# Patient Record
Sex: FEMALE | Race: WHITE | Employment: FULL TIME | URBAN - METROPOLITAN AREA
[De-identification: names, ages, dates, MRNs, and addresses within clinical notes are randomized per-mention and may not be internally consistent; named-entity substitution may affect disease eponyms.]

---

## 2023-12-26 ENCOUNTER — OFFICE VISIT (OUTPATIENT)
Dept: PRIMARY CARE | Facility: CLINIC | Age: 23
End: 2023-12-26
Payer: COMMERCIAL

## 2023-12-26 VITALS
SYSTOLIC BLOOD PRESSURE: 108 MMHG | OXYGEN SATURATION: 98 % | DIASTOLIC BLOOD PRESSURE: 68 MMHG | TEMPERATURE: 97.1 F | BODY MASS INDEX: 22.26 KG/M2 | WEIGHT: 121 LBS | HEART RATE: 100 BPM | HEIGHT: 62 IN

## 2023-12-26 DIAGNOSIS — N30.90 CYSTITIS: Primary | ICD-10-CM

## 2023-12-26 DIAGNOSIS — R39.9 UTI SYMPTOMS: ICD-10-CM

## 2023-12-26 LAB
POC APPEARANCE, URINE: ABNORMAL
POC BILIRUBIN, URINE: NEGATIVE
POC BLOOD, URINE: ABNORMAL
POC COLOR, URINE: ABNORMAL
POC GLUCOSE, URINE: NEGATIVE MG/DL
POC KETONES, URINE: NEGATIVE MG/DL
POC LEUKOCYTES, URINE: ABNORMAL
POC NITRITE,URINE: NEGATIVE
POC PH, URINE: 7 PH
POC PROTEIN, URINE: ABNORMAL MG/DL
POC SPECIFIC GRAVITY, URINE: 1.01
POC UROBILINOGEN, URINE: 2 EU/DL

## 2023-12-26 PROCEDURE — 81003 URINALYSIS AUTO W/O SCOPE: CPT

## 2023-12-26 PROCEDURE — 99213 OFFICE O/P EST LOW 20 MIN: CPT

## 2023-12-26 PROCEDURE — 87086 URINE CULTURE/COLONY COUNT: CPT

## 2023-12-26 PROCEDURE — 1036F TOBACCO NON-USER: CPT

## 2023-12-26 RX ORDER — NORGESTIMATE AND ETHINYL ESTRADIOL 7DAYSX3 28
KIT ORAL
COMMUNITY
Start: 2023-04-04 | End: 2024-03-01

## 2023-12-26 RX ORDER — NITROFURANTOIN 25 MG/5ML
100 SUSPENSION ORAL 2 TIMES DAILY
Qty: 280 ML | Refills: 0 | Status: SHIPPED | OUTPATIENT
Start: 2023-12-26 | End: 2024-01-02

## 2023-12-26 ASSESSMENT — ENCOUNTER SYMPTOMS
FREQUENCY: 1
VOMITING: 0
FATIGUE: 0
ACTIVITY CHANGE: 0
BACK PAIN: 0
DIFFICULTY URINATING: 0
FLANK PAIN: 0
HEMATURIA: 0
SWEATS: 0
NAUSEA: 0
DYSURIA: 1
FEVER: 0
APPETITE CHANGE: 0
CHILLS: 0

## 2023-12-26 ASSESSMENT — PAIN SCALES - GENERAL: PAINLEVEL: 0-NO PAIN

## 2023-12-26 NOTE — PROGRESS NOTES
"Subjective   Patient ID: Mariaelena Lamb is a 23 y.o. female who presents for Urinary Frequency and Urinary Problem (X 3 days).    UTI   This is a new problem. Episode onset: 3 days. The problem occurs every urination. The problem has been gradually worsening. The quality of the pain is described as burning. The pain is moderate. There has been no fever. There is No history of pyelonephritis. Associated symptoms include frequency and urgency. Pertinent negatives include no chills, discharge, flank pain, hematuria, hesitancy, nausea, possible pregnancy, sweats or vomiting. She has tried nothing for the symptoms.            Review of Systems   Constitutional:  Negative for activity change, appetite change, chills, fatigue and fever.   Gastrointestinal:  Negative for nausea and vomiting.   Genitourinary:  Positive for dysuria, frequency and urgency. Negative for decreased urine volume, difficulty urinating, flank pain, hematuria, hesitancy and pelvic pain.   Musculoskeletal:  Negative for back pain.       Objective   Blood Pressure 108/68 (BP Location: Left arm)   Pulse 100   Temperature 36.2 °C (97.1 °F) (Temporal)   Height 1.575 m (5' 2\")   Weight 54.9 kg (121 lb)   Oxygen Saturation 98%   Body Mass Index 22.13 kg/m²     Physical Exam  Vitals and nursing note reviewed.   Constitutional:       General: She is not in acute distress.     Appearance: Normal appearance. She is normal weight.   Cardiovascular:      Rate and Rhythm: Normal rate and regular rhythm.      Heart sounds: Normal heart sounds, S1 normal and S2 normal.   Pulmonary:      Effort: Pulmonary effort is normal.      Breath sounds: Normal breath sounds.   Abdominal:      General: Abdomen is flat. Bowel sounds are normal. There is no distension.      Palpations: Abdomen is soft. There is no hepatomegaly or splenomegaly.      Tenderness: There is abdominal tenderness in the suprapubic area. There is no right CVA tenderness, left CVA tenderness, guarding " or rebound.   Skin:     General: Skin is warm and dry.      Capillary Refill: Capillary refill takes less than 2 seconds.   Neurological:      General: No focal deficit present.      Mental Status: She is alert.         Assessment/Plan   Problem List Items Addressed This Visit    None  Visit Diagnoses       Diagnosis Codes    Cystitis    -  Primary  Acute.  Urine dipstick: +leukocytes, +blood  Urine sent for C & S, will call with results and adjust treatment accordingly.  Start ATB - please complete full course unless you hear otherwise from our office.  Discussed indication for antibiotic use, administration instructions, and adverse effects with patient.  Increase your fluid intake, Tylenol or Motrin as needed for pain or fever.  May use OTC AZO/UROSTAT/Cystex for symptoms relief if desired.   Discussed hygiene for prevention.  Call our office to report and new fever/chills or back pain.  Follow up in 1 week if symptoms persist.    N30.90    Relevant Medications    nitrofurantoin (Furadantin) 25 mg/5 mL suspension    UTI symptoms     R39.9    Relevant Orders    POCT UA Automated manually resulted (Completed)    Urine Culture

## 2023-12-28 LAB — BACTERIA UR CULT: NORMAL

## 2024-01-25 ENCOUNTER — OFFICE VISIT (OUTPATIENT)
Dept: PRIMARY CARE | Facility: CLINIC | Age: 24
End: 2024-01-25
Payer: COMMERCIAL

## 2024-01-25 VITALS
TEMPERATURE: 97.8 F | DIASTOLIC BLOOD PRESSURE: 70 MMHG | OXYGEN SATURATION: 99 % | WEIGHT: 122 LBS | BODY MASS INDEX: 22.31 KG/M2 | SYSTOLIC BLOOD PRESSURE: 108 MMHG | HEART RATE: 88 BPM

## 2024-01-25 DIAGNOSIS — N76.0 ACUTE VAGINITIS: Primary | ICD-10-CM

## 2024-01-25 DIAGNOSIS — R30.0 BURNING WITH URINATION: ICD-10-CM

## 2024-01-25 LAB
POC APPEARANCE, URINE: CLEAR
POC BILIRUBIN, URINE: NEGATIVE
POC BLOOD, URINE: NEGATIVE
POC COLOR, URINE: YELLOW
POC GLUCOSE, URINE: NEGATIVE MG/DL
POC KETONES, URINE: NEGATIVE MG/DL
POC LEUKOCYTES, URINE: NEGATIVE
POC NITRITE,URINE: NEGATIVE
POC PH, URINE: 5.5 PH
POC PROTEIN, URINE: NEGATIVE MG/DL
POC SPECIFIC GRAVITY, URINE: <=1.005
POC UROBILINOGEN, URINE: 0.2 EU/DL

## 2024-01-25 PROCEDURE — 1036F TOBACCO NON-USER: CPT | Performed by: NURSE PRACTITIONER

## 2024-01-25 PROCEDURE — 87205 SMEAR GRAM STAIN: CPT

## 2024-01-25 PROCEDURE — 99214 OFFICE O/P EST MOD 30 MIN: CPT | Performed by: NURSE PRACTITIONER

## 2024-01-25 PROCEDURE — 81003 URINALYSIS AUTO W/O SCOPE: CPT | Performed by: NURSE PRACTITIONER

## 2024-01-25 RX ORDER — FLUCONAZOLE 150 MG/1
150 TABLET ORAL ONCE
Qty: 2 TABLET | Refills: 0 | Status: SHIPPED | OUTPATIENT
Start: 2024-01-25 | End: 2024-01-25

## 2024-01-25 ASSESSMENT — PAIN SCALES - GENERAL: PAINLEVEL: 0-NO PAIN

## 2024-01-25 NOTE — PROGRESS NOTES
Subjective   Patient ID: Mariaelena Lamb is a 23 y.o. female who presents for UTI (Urgency, frequency, and burning. X4 days.).    HPI   Pt complains of urinary urgency, frequency, dysuria, discharge and external burning/itching. Sx began 4 days ago. No OTC used. Oral intake normal.     Review of Systems  ROS negative with exceptions above    Objective   /70   Pulse 88   Temp 36.6 °C (97.8 °F)   Wt 55.3 kg (122 lb)   LMP 01/11/2024 (Approximate)   SpO2 99%   BMI 22.31 kg/m²     Physical Exam  Constitutional:       General: She is not in acute distress.     Appearance: Normal appearance.   Cardiovascular:      Rate and Rhythm: Normal rate and regular rhythm.   Pulmonary:      Effort: Pulmonary effort is normal. No respiratory distress.      Breath sounds: Normal breath sounds.   Abdominal:      General: Bowel sounds are normal.      Palpations: Abdomen is soft. There is no mass.      Tenderness: There is no abdominal tenderness. There is no right CVA tenderness, left CVA tenderness, guarding or rebound.   Genitourinary:     Comments: Normal external genitalia. Thick white discharge in vaginal vault. Cervix closed, no sorees/lesions. No CMT. No adnexal tenderness/masses.  Skin:     General: Skin is warm and dry.   Neurological:      General: No focal deficit present.      Mental Status: She is alert.   Psychiatric:         Mood and Affect: Mood normal.         Assessment/Plan   Diagnoses and all orders for this visit:  Acute vaginitis  -     Vaginitis Gram Stain For Bacterial Vaginosis + Yeast  -     fluconazole (Diflucan) 150 mg tablet; Take 1 tablet (150 mg) by mouth 1 time for 1 dose. May repeat in 7-10 days if symptoms persist  Concern for vaginitis - will cover for yeast based on exam and sx  Affirm swab obtained and sent - await results  Good vaginal hygiene, avoid vaginal irritants  Follow up 7-10 days INB  Burning with urination  -     POCT UA Automated manually resulted  Urine dip normal  Fluids, good  bladder hygiene

## 2024-01-27 LAB
CLUE CELLS VAG LPF-#/AREA: NORMAL /[LPF]
NUGENT SCORE: 0
YEAST VAG WET PREP-#/AREA: NORMAL

## 2024-09-07 ENCOUNTER — LAB REQUISITION (OUTPATIENT)
Dept: LAB | Facility: HOSPITAL | Age: 24
End: 2024-09-07
Payer: COMMERCIAL

## 2024-09-07 DIAGNOSIS — N77.1 VAGINITIS, VULVITIS AND VULVOVAGINITIS IN DISEASES CLASSIFIED ELSEWHERE: ICD-10-CM

## 2024-09-07 DIAGNOSIS — N39.0 URINARY TRACT INFECTION, SITE NOT SPECIFIED: ICD-10-CM

## 2024-09-07 PROCEDURE — 87661 TRICHOMONAS VAGINALIS AMPLIF: CPT

## 2024-09-07 PROCEDURE — 87086 URINE CULTURE/COLONY COUNT: CPT

## 2024-09-07 PROCEDURE — 87102 FUNGUS ISOLATION CULTURE: CPT

## 2024-09-07 PROCEDURE — 87491 CHLMYD TRACH DNA AMP PROBE: CPT

## 2024-09-07 PROCEDURE — 87591 N.GONORRHOEAE DNA AMP PROB: CPT

## 2024-09-08 LAB
C TRACH RRNA SPEC QL NAA+PROBE: NEGATIVE
N GONORRHOEA DNA SPEC QL PROBE+SIG AMP: NEGATIVE
T VAGINALIS RRNA SPEC QL NAA+PROBE: NEGATIVE

## 2024-09-09 ENCOUNTER — APPOINTMENT (OUTPATIENT)
Dept: PRIMARY CARE | Facility: CLINIC | Age: 24
End: 2024-09-09
Payer: COMMERCIAL

## 2024-09-09 LAB — BACTERIA UR CULT: NORMAL

## 2024-09-10 LAB — YEAST SPEC QL CULT: ABNORMAL

## 2025-01-28 DIAGNOSIS — Z30.41 ENCOUNTER FOR SURVEILLANCE OF CONTRACEPTIVE PILLS: ICD-10-CM

## 2025-01-28 RX ORDER — NORGESTIMATE AND ETHINYL ESTRADIOL 7DAYSX3 LO
1 KIT ORAL DAILY
Qty: 28 TABLET | Refills: 0 | Status: SHIPPED | OUTPATIENT
Start: 2025-01-28